# Patient Record
Sex: MALE | Race: OTHER
[De-identification: names, ages, dates, MRNs, and addresses within clinical notes are randomized per-mention and may not be internally consistent; named-entity substitution may affect disease eponyms.]

---

## 2017-06-08 ENCOUNTER — HOSPITAL ENCOUNTER (OUTPATIENT)
Dept: HOSPITAL 61 - PCVCCLINIC | Age: 82
Discharge: HOME | End: 2017-06-08
Attending: INTERNAL MEDICINE
Payer: MEDICARE

## 2017-06-08 DIAGNOSIS — J44.9: ICD-10-CM

## 2017-06-08 DIAGNOSIS — Z87.891: ICD-10-CM

## 2017-06-08 DIAGNOSIS — E78.5: ICD-10-CM

## 2017-06-08 DIAGNOSIS — Z79.899: ICD-10-CM

## 2017-06-08 DIAGNOSIS — I10: ICD-10-CM

## 2017-06-08 DIAGNOSIS — I25.10: Primary | ICD-10-CM

## 2017-06-08 PROCEDURE — 93005 ELECTROCARDIOGRAM TRACING: CPT

## 2017-06-08 PROCEDURE — G0463 HOSPITAL OUTPT CLINIC VISIT: HCPCS

## 2017-06-08 PROCEDURE — 80061 LIPID PANEL: CPT

## 2017-12-08 ENCOUNTER — HOSPITAL ENCOUNTER (OUTPATIENT)
Dept: HOSPITAL 61 - PCVCCLINIC | Age: 82
Discharge: HOME | End: 2017-12-08
Attending: INTERNAL MEDICINE
Payer: MEDICARE

## 2017-12-08 DIAGNOSIS — I10: ICD-10-CM

## 2017-12-08 DIAGNOSIS — Z79.899: ICD-10-CM

## 2017-12-08 DIAGNOSIS — Z87.891: ICD-10-CM

## 2017-12-08 DIAGNOSIS — I25.10: Primary | ICD-10-CM

## 2017-12-08 DIAGNOSIS — I65.23: ICD-10-CM

## 2017-12-08 DIAGNOSIS — E78.5: ICD-10-CM

## 2017-12-08 DIAGNOSIS — R55: ICD-10-CM

## 2017-12-08 PROCEDURE — 93005 ELECTROCARDIOGRAM TRACING: CPT

## 2017-12-08 PROCEDURE — 80061 LIPID PANEL: CPT

## 2017-12-08 PROCEDURE — G0463 HOSPITAL OUTPT CLINIC VISIT: HCPCS

## 2018-08-06 ENCOUNTER — HOSPITAL ENCOUNTER (OUTPATIENT)
Dept: HOSPITAL 61 - PCVCCLINIC | Age: 83
Discharge: HOME | End: 2018-08-06
Attending: INTERNAL MEDICINE
Payer: MEDICARE

## 2018-08-06 DIAGNOSIS — I25.10: Primary | ICD-10-CM

## 2018-08-06 DIAGNOSIS — I65.23: ICD-10-CM

## 2018-08-06 DIAGNOSIS — Z87.891: ICD-10-CM

## 2018-08-06 DIAGNOSIS — E78.5: ICD-10-CM

## 2018-08-06 DIAGNOSIS — I10: ICD-10-CM

## 2018-08-06 DIAGNOSIS — R55: ICD-10-CM

## 2018-08-06 PROCEDURE — 93005 ELECTROCARDIOGRAM TRACING: CPT

## 2019-09-06 ENCOUNTER — HOSPITAL ENCOUNTER (OUTPATIENT)
Dept: HOSPITAL 61 - PCVCCLINIC | Age: 84
Discharge: HOME | End: 2019-09-06
Attending: INTERNAL MEDICINE
Payer: MEDICARE

## 2019-09-06 DIAGNOSIS — R55: ICD-10-CM

## 2019-09-06 DIAGNOSIS — E78.5: ICD-10-CM

## 2019-09-06 DIAGNOSIS — I65.23: ICD-10-CM

## 2019-09-06 DIAGNOSIS — E78.00: ICD-10-CM

## 2019-09-06 DIAGNOSIS — I25.10: Primary | ICD-10-CM

## 2019-09-06 DIAGNOSIS — K21.9: ICD-10-CM

## 2019-09-06 DIAGNOSIS — I10: ICD-10-CM

## 2019-09-06 PROCEDURE — 80061 LIPID PANEL: CPT

## 2019-09-06 PROCEDURE — G0463 HOSPITAL OUTPT CLINIC VISIT: HCPCS

## 2019-09-06 PROCEDURE — 93005 ELECTROCARDIOGRAM TRACING: CPT

## 2019-09-06 PROCEDURE — 36415 COLL VENOUS BLD VENIPUNCTURE: CPT

## 2019-12-31 NOTE — EKG
[facility]
[email or web address]
[place]  [address]
Phone:  [phone]                    ELECTROCARDIOGRAM REPORT      
_______________________________________________________________________________
 
Name:       CAROL MCDOWELL             [facility]                     [id number]     Account #:      [id number]  
Admission:  19    Attend Phys:                          
Discharge:  19    Date of Birth:  [birth year]  
                                                          Report #: [id number]
   [id number]
_______________________________________________________________________________
THIS REPORT FOR:   //name//                          
 
                         [facility] ED
                                       
Test Date:    2019               Test Time:    08:08:22
Pat Name:     CAROL MCDOWELL             Department:   
Patient ID:   [id number]            Room:          
Gender:       M                        Technician:   
:          [id number]               Requested By: Rosa Gilbert
Order Number: [id number] MD:   Sami Chavez
                                 Measurements
Intervals                              Axis          
Rate:         86                       P:            21
DC:           176                      QRS:          -30
QRSD:         113                      T:            11
QT:           376                                    
QTc:          450                                    
                           Interpretive Statements
Sinus rhythm
Incomplete right bundle branch block
Left ventricular hypertrophy
Compared to ECG 2016 07:30:13
Incomplete right bundle-branch block now present
Left ventricular hypertrophy now present
 
Electronically Signed On 2019 9:55:12 CST by Sami Chavez
[email or web address]
 
 
 
 
 
 
 
 
 
 
 
 
 
 
 
 
  <ELECTRONICALLY SIGNED>
   By: Sami Chavez MD, [facility]   
  19     [id number]
D: 19                           _____________________________________
T: 19                           Sami Chavez MD, [facility]     /EPI

## 2021-04-08 ENCOUNTER — HOSPITAL ENCOUNTER (OUTPATIENT)
Dept: HOSPITAL 35 - SJCVC | Age: 86
End: 2021-04-08
Attending: INTERNAL MEDICINE
Payer: COMMERCIAL

## 2021-04-08 DIAGNOSIS — C44.91: ICD-10-CM

## 2021-04-08 DIAGNOSIS — E78.00: ICD-10-CM

## 2021-04-08 DIAGNOSIS — E78.5: ICD-10-CM

## 2021-04-08 DIAGNOSIS — J44.9: ICD-10-CM

## 2021-04-08 DIAGNOSIS — I10: ICD-10-CM

## 2021-04-08 DIAGNOSIS — R94.31: ICD-10-CM

## 2021-04-08 DIAGNOSIS — I25.10: ICD-10-CM

## 2021-04-08 DIAGNOSIS — R55: ICD-10-CM

## 2021-04-08 DIAGNOSIS — Z79.899: ICD-10-CM

## 2021-04-08 DIAGNOSIS — I45.10: Primary | ICD-10-CM

## 2021-04-08 DIAGNOSIS — Z87.891: ICD-10-CM

## 2021-04-08 DIAGNOSIS — K21.9: ICD-10-CM

## 2021-10-26 ENCOUNTER — HOSPITAL ENCOUNTER (OUTPATIENT)
Dept: HOSPITAL 35 - SJCVC | Age: 86
End: 2021-10-26
Attending: INTERNAL MEDICINE
Payer: COMMERCIAL

## 2021-10-26 DIAGNOSIS — I25.10: Primary | ICD-10-CM

## 2021-10-26 DIAGNOSIS — R55: ICD-10-CM

## 2021-10-26 DIAGNOSIS — Z87.891: ICD-10-CM

## 2021-10-26 DIAGNOSIS — Z79.82: ICD-10-CM

## 2021-10-26 DIAGNOSIS — C44.91: ICD-10-CM

## 2021-10-26 DIAGNOSIS — Z79.899: ICD-10-CM

## 2021-10-26 DIAGNOSIS — E78.00: ICD-10-CM

## 2021-10-26 DIAGNOSIS — Z98.890: ICD-10-CM

## 2021-10-26 DIAGNOSIS — J44.9: ICD-10-CM

## 2021-10-26 DIAGNOSIS — E78.5: ICD-10-CM

## 2021-10-26 DIAGNOSIS — K21.9: ICD-10-CM

## 2021-10-26 DIAGNOSIS — I10: ICD-10-CM
